# Patient Record
(demographics unavailable — no encounter records)

---

## 2024-10-29 NOTE — HISTORY OF PRESENT ILLNESS
[FreeTextEntry1] : 30yo M PMHx treated syphilis who presents for follow-up.  Has had penile lesions initially trialed treatment with imiquimod, but had developed red flat lesions and was given trial of topical clotrimazole to treat balanitis. Since then, the red lesions have resolved. However, he has noticed one flat pink discoloration to the distal shaft and a smaller flat pink discoloration to the glans penis which first appeared after starting the clotrimazole. No pain or itchiness, these do not bother him.  Has not had any sexual exposures since his treatment for syphilis. [Sexually Active] : The patient is not sexually active

## 2024-10-29 NOTE — ASSESSMENT
[FreeTextEntry1] : 28yo M PMHx treated syphilis who presents for follow-up.  #Balanitis S/p treatment with imiquimod without improvement and then with topical clotrimazole with resolution of red lesions but now with flat pink spotty discoloration without pain or itchiness. No new sexual exposure. Possibly represents reaction to the topical clotrimazole. 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 He was given Bicillin 2.4 M units x3 on 11/8, 11/15, and 11/22. Last RPR 1:2 (5/14/24), down from 1:16 (10/17/23). - Monitor RPR, will re-check today - Note Treponemal Ab will be positive for life  #STI prevention Patient has not been taking HIV PrEP and Doxy-PEP as he has not been sexually active - patient will let us know if he wishes to restart  RTC 3 months.

## 2024-10-29 NOTE — END OF VISIT
[] : Fellow [FreeTextEntry3] : 29M completedb treatment for syphilis, RPR declining. With penile lesions, somewhat improved after aldara, clotrimazole, will stop now and assess. Complete HPV vaccine series, F/U 3 months. Not sexually active, not using PrEP or doxyPEP.

## 2024-10-29 NOTE — PHYSICAL EXAM
[General Appearance - Alert] : alert [General Appearance - Well-Appearing] : healthy appearing [] : no respiratory distress [Auscultation Breath Sounds / Voice Sounds] : lungs were clear to auscultation bilaterally [Heart Rate And Rhythm] : heart rate was normal and rhythm regular [Heart Sounds] : normal S1 and S2 [Inguinal Lymph Nodes Enlarged Bilaterally] : inguinal [FreeTextEntry1] : see male REMBERTO

## 2025-01-28 NOTE — PHYSICAL EXAM
[General Appearance - Alert] : alert [General Appearance - Well-Appearing] : healthy appearing [] : no respiratory distress [Inguinal Lymph Nodes Enlarged Bilaterally] : inguinal [FreeTextEntry1] : Penis has 2 pink macular areas, one larger one on distal end of shaft and one smaller on the glans penis. Small reddish flat dot-like lesions on the glans penis

## 2025-01-28 NOTE — HISTORY OF PRESENT ILLNESS
[FreeTextEntry1] : 30yo M PMHx treated secondary syphilis who presents for follow-up.  Has had discoloration of his penis for the past few months, he noticed small flat reddish dots on the glans penis and a new small lesion on the shaft that is neither painful nor itchy over the past month. Has not been using clotrimazole or imiquimod to eliminate adverse effects of medication as a case. No oral lesions, no sexual activity since his diagnosis of syphilis.  [Sexually Active] : The patient is not sexually active

## 2025-01-28 NOTE — ASSESSMENT
[FreeTextEntry1] : 30yo M PMHx treated secondary syphilis who presents for follow-up.  #Penile lesions S/p treatment with imiquimod without improvement and then with topical clotrimazole with resolution of red lesions but now with flat pink spotty discoloration without pain or itchiness. No new sexual exposure. Has been off clotrimazole now to rule out possible adverse reaction. Does not appear to be infectious, favor localized vitiligo. - dermatology referral - Gardasil-9 vaccination - first dose 7/23/24, second dose 9/24/24, last dose today  #Syphilis Treated secondary syphilis, negative for HIV. He was given Bicillin 2.4 M units x3 on 11/8, 11/15, and 11/22. Last RPR 1:2 (5/14/24), down from 1:16 (10/17/23). Has been serofast at 1:2 - Monitor RPR q6months until serofast or negative - Note Treponemal Ab will be positive for life  #STI prevention Patient has not been taking HIV PrEP and Doxy-PEP as he has not been sexually active - patient will let us know if he wishes to restart  RTC as needed.

## 2025-01-28 NOTE — END OF VISIT
[] : Fellow [FreeTextEntry3] : 29M with H/O secondary syphilis, also with penile discoloration, non-responsive to condyloma treatment with imiquimod or balantitis Rx antifungal, lesions appear as vitiligo, refer to dermatology. Complete HPV vaccine series. PrEP offered, not sexually active.